# Patient Record
(demographics unavailable — no encounter records)

---

## 2025-06-11 NOTE — POST OP
[0] : no pain reported [Doing Well] : is doing well [Excellent Pain Control] : has excellent pain control [No Sign of Infection] : is showing no signs of infection [Fever] : no fever [de-identified] : 7 yo female s/p above. She is doing well in the LAC. No fever or chills. No pain reported. She is here for the first time for xrays in the cast.  [de-identified] : 6/2/25: right elbow closed reduction percutaneous pinning of type III supracondylar fracture with application of LAC [de-identified] : Cast is present and well fitting Skin is intact to the areas exposed. fingers mobile sensation grossly intact no pain with passive stretch of the digits. brisk cap refill   [de-identified] : 3 views of the right elbow in the cast today reveal hardware in place. Good overall alignment. No change from intraop.  [de-identified] : She will continue the LAC for an additional 2 weeks. She will f/u in 2 weeks for cast removal and xrays out of the cast and possible Pin discontinuation at that time. No gym or sports Cast care instructions All questions answered. Parent in agreement with the plan.  IChapis, RAOUL, PAC, have acted as a scribe and documented the above for Dr. Dailey.  The above documentation completed by the scribe is an accurate record of both my words and actions.  SOLIS

## 2025-06-11 NOTE — POST OP
[0] : no pain reported [Doing Well] : is doing well [Excellent Pain Control] : has excellent pain control [No Sign of Infection] : is showing no signs of infection [Fever] : no fever [de-identified] : 6/2/25: right elbow closed reduction percutaneous pinning of type III supracondylar fracture with application of LAC [de-identified] : 5 yo female s/p above. She is doing well in the LAC. No fever or chills. No pain reported. She is here for the first time for xrays in the cast.  [de-identified] : Cast is present and well fitting Skin is intact to the areas exposed. fingers mobile sensation grossly intact no pain with passive stretch of the digits. brisk cap refill   [de-identified] : 3 views of the right elbow in the cast today reveal hardware in place. Good overall alignment. No change from intraop.  [de-identified] : She will continue the LAC for an additional 2 weeks. She will f/u in 2 weeks for cast removal and xrays out of the cast and possible Pin discontinuation at that time. No gym or sports Cast care instructions All questions answered. Parent in agreement with the plan.  IChapis, RAOUL, PAC, have acted as a scribe and documented the above for Dr. Dailey.  The above documentation completed by the scribe is an accurate record of both my words and actions.  SOLIS

## 2025-06-25 NOTE — POST OP
[0] : no pain reported [Doing Well] : is doing well [Excellent Pain Control] : has excellent pain control [No Sign of Infection] : is showing no signs of infection [Fever] : no fever [de-identified] : 6/2/25: right elbow closed reduction percutaneous pinning of type III supracondylar fracture with application of LAC [de-identified] : 7 yo female s/p above. She is doing well in the LAC. No fever or chills. No pain reported. She is here for the cast removal and further orthopedic management.  [de-identified] : Cast is present and well fitting. Removed for examination K-wires in place and intact No surrounding erythema or discharge Limited elbow range of motion due to stiffness Minimal tenderness about the fracture site  [de-identified] : 3 views of the right elbow OOC today reveal hardware in place. Good overall alignment. Interval healing and callus formation [de-identified] : Porfirio is a 6Y female s/p right elbow closed reduction percutaneous pinning of type III supracondylar fracture with application of LAC, DOS 6/2/25 Today's visit included obtaining history from the parent due to the child's age, the child could not be considered a reliable historian, requiring parent to act as independent historian  Clinical findings and imaging discussed at length with father. Her LAC removed today in the office. Radiographs performed today reviewed at length. There is interval healing and callus formation. Pins were removed today in the office. Patient tolerated the procedure well. At this time, she can start working on elbow range of motion at home. Avoid gym, sports and playground activities. She will f/u in 3 weeks for repeat clinical evaluation, ROM check and XR right elbow. All questions answered. Family and patient verbalize understanding of the plan.   Cheryle LOPEZ PA-C have acted as scribe and documented the above for Dr. Lebron  The above documentation completed by the scribe is an accurate record of both my words and actions.  JPD

## 2025-06-25 NOTE — POST OP
[0] : no pain reported [Doing Well] : is doing well [Excellent Pain Control] : has excellent pain control [No Sign of Infection] : is showing no signs of infection [Fever] : no fever [de-identified] : 6/2/25: right elbow closed reduction percutaneous pinning of type III supracondylar fracture with application of LAC [de-identified] : 5 yo female s/p above. She is doing well in the LAC. No fever or chills. No pain reported. She is here for the cast removal and further orthopedic management.  [de-identified] : Cast is present and well fitting. Removed for examination K-wires in place and intact No surrounding erythema or discharge Limited elbow range of motion due to stiffness Minimal tenderness about the fracture site  [de-identified] : 3 views of the right elbow OOC today reveal hardware in place. Good overall alignment. Interval healing and callus formation [de-identified] : Porfirio is a 6Y female s/p right elbow closed reduction percutaneous pinning of type III supracondylar fracture with application of LAC, DOS 6/2/25 Today's visit included obtaining history from the parent due to the child's age, the child could not be considered a reliable historian, requiring parent to act as independent historian  Clinical findings and imaging discussed at length with father. Her LAC removed today in the office. Radiographs performed today reviewed at length. There is interval healing and callus formation. Pins were removed today in the office. Patient tolerated the procedure well. At this time, she can start working on elbow range of motion at home. Avoid gym, sports and playground activities. She will f/u in 3 weeks for repeat clinical evaluation, ROM check and XR right elbow. All questions answered. Family and patient verbalize understanding of the plan.   Cheryle LOPEZ PA-C have acted as scribe and documented the above for Dr. Lebron  The above documentation completed by the scribe is an accurate record of both my words and actions.  JPD

## 2025-07-16 NOTE — POST OP
[0] : no pain reported [Neuro Intact] : an unremarkable neurological exam [Vascular Intact] : ~T peripheral vascular exam normal [Doing Well] : is doing well [Excellent Pain Control] : has excellent pain control [No Sign of Infection] : is showing no signs of infection [Fever] : no fever [de-identified] : 6/2/25: right elbow closed reduction percutaneous pinning of type III supracondylar fracture with application of LAC [de-identified] : 7 yo female s/p above. She is doing well. She is here today for ROm check. No pain reported.  [de-identified] : pin sites healed No tenderness over the fracture site Flexion to approx 120 degrees, extension lacking approx 5 degrees to full (left +hyperextension 10 degrees0 CA symmetrical  [de-identified] : SHe has regained most of her motion. She may resume activity as tolerated. The rest of her motion will come over time. She will transition to f/u on a PRN basis. All questions answered. Parent in agreement with the plan. IChapis, MPAS, PAC, have acted as a scribe and documented the above for Dr. Freeman.  The above documentation completed by the scribe is an accurate record of both my words and actions.  YANAD